# Patient Record
Sex: FEMALE | Race: WHITE | NOT HISPANIC OR LATINO | URBAN - METROPOLITAN AREA
[De-identification: names, ages, dates, MRNs, and addresses within clinical notes are randomized per-mention and may not be internally consistent; named-entity substitution may affect disease eponyms.]

---

## 2017-06-16 ENCOUNTER — COMMUNICATION - HEALTHEAST (OUTPATIENT)
Dept: HEALTH INFORMATION MANAGEMENT | Facility: CLINIC | Age: 32
End: 2017-06-16

## 2018-05-15 ENCOUNTER — OFFICE VISIT - HEALTHEAST (OUTPATIENT)
Dept: FAMILY MEDICINE | Facility: CLINIC | Age: 33
End: 2018-05-15

## 2018-05-15 DIAGNOSIS — Z00.00 ROUTINE GENERAL MEDICAL EXAMINATION AT A HEALTH CARE FACILITY: ICD-10-CM

## 2018-05-15 LAB
ANION GAP SERPL CALCULATED.3IONS-SCNC: 10 MMOL/L (ref 5–18)
BUN SERPL-MCNC: 16 MG/DL (ref 8–22)
CALCIUM SERPL-MCNC: 9.8 MG/DL (ref 8.5–10.5)
CHLORIDE BLD-SCNC: 106 MMOL/L (ref 98–107)
CHOLEST SERPL-MCNC: 135 MG/DL
CO2 SERPL-SCNC: 25 MMOL/L (ref 22–31)
CREAT SERPL-MCNC: 0.75 MG/DL (ref 0.6–1.1)
ERYTHROCYTE [DISTWIDTH] IN BLOOD BY AUTOMATED COUNT: 12.5 % (ref 11–14.5)
FASTING STATUS PATIENT QL REPORTED: ABNORMAL
GFR SERPL CREATININE-BSD FRML MDRD: >60 ML/MIN/1.73M2
GLUCOSE BLD-MCNC: 91 MG/DL (ref 70–125)
HCT VFR BLD AUTO: 39.7 % (ref 35–47)
HDLC SERPL-MCNC: 44 MG/DL
HGB BLD-MCNC: 13.4 G/DL (ref 12–16)
LDLC SERPL CALC-MCNC: 69 MG/DL
MCH RBC QN AUTO: 30 PG (ref 27–34)
MCHC RBC AUTO-ENTMCNC: 33.6 G/DL (ref 32–36)
MCV RBC AUTO: 89 FL (ref 80–100)
PLATELET # BLD AUTO: 199 THOU/UL (ref 140–440)
PMV BLD AUTO: 8.7 FL (ref 7–10)
POTASSIUM BLD-SCNC: 4.8 MMOL/L (ref 3.5–5)
RBC # BLD AUTO: 4.46 MILL/UL (ref 3.8–5.4)
SODIUM SERPL-SCNC: 141 MMOL/L (ref 136–145)
TRIGL SERPL-MCNC: 108 MG/DL
WBC: 5.4 THOU/UL (ref 4–11)

## 2018-05-15 ASSESSMENT — MIFFLIN-ST. JEOR: SCORE: 1345.09

## 2019-05-28 ENCOUNTER — OFFICE VISIT - HEALTHEAST (OUTPATIENT)
Dept: MIDWIFE SERVICES | Facility: CLINIC | Age: 34
End: 2019-05-28

## 2019-05-28 DIAGNOSIS — R35.0 URINARY FREQUENCY: ICD-10-CM

## 2019-05-28 DIAGNOSIS — Z12.4 SCREENING FOR CERVICAL CANCER: ICD-10-CM

## 2019-05-28 DIAGNOSIS — K64.9 HEMORRHOIDS, UNSPECIFIED HEMORRHOID TYPE: ICD-10-CM

## 2019-05-28 DIAGNOSIS — Z00.00 ANNUAL PHYSICAL EXAM: ICD-10-CM

## 2019-05-28 LAB
ALBUMIN UR-MCNC: NEGATIVE MG/DL
APPEARANCE UR: CLEAR
BILIRUB UR QL STRIP: NEGATIVE
COLOR UR AUTO: YELLOW
GLUCOSE UR STRIP-MCNC: NEGATIVE MG/DL
HGB UR QL STRIP: NEGATIVE
KETONES UR STRIP-MCNC: NEGATIVE MG/DL
LEUKOCYTE ESTERASE UR QL STRIP: NEGATIVE
NITRATE UR QL: NEGATIVE
PH UR STRIP: 7 [PH] (ref 5–8)
SP GR UR STRIP: 1.01 (ref 1–1.03)
UROBILINOGEN UR STRIP-ACNC: NORMAL

## 2019-05-28 ASSESSMENT — MIFFLIN-ST. JEOR: SCORE: 1370.49

## 2019-05-29 LAB
HPV SOURCE: NORMAL
HUMAN PAPILLOMA VIRUS 16 DNA: NEGATIVE
HUMAN PAPILLOMA VIRUS 18 DNA: NEGATIVE
HUMAN PAPILLOMA VIRUS FINAL DIAGNOSIS: NORMAL
HUMAN PAPILLOMA VIRUS OTHER HR: NEGATIVE
SPECIMEN DESCRIPTION: NORMAL

## 2019-06-03 LAB
BKR LAB AP ABNORMAL BLEEDING: NO
BKR LAB AP BIRTH CONTROL/HORMONES: NORMAL
BKR LAB AP CERVICAL APPEARANCE: NORMAL
BKR LAB AP GYN ADEQUACY: NORMAL
BKR LAB AP GYN INTERPRETATION: NORMAL
BKR LAB AP HPV REFLEX: NORMAL
BKR LAB AP LMP: NORMAL
BKR LAB AP PATIENT STATUS: NORMAL
BKR LAB AP PREVIOUS ABNORMAL: NO
BKR LAB AP PREVIOUS NORMAL: NORMAL
HIGH RISK?: NO
PATH REPORT.COMMENTS IMP SPEC: NORMAL
RESULT FLAG (HE HISTORICAL CONVERSION): NORMAL

## 2020-07-17 ENCOUNTER — COMMUNICATION - HEALTHEAST (OUTPATIENT)
Dept: MIDWIFE SERVICES | Facility: CLINIC | Age: 35
End: 2020-07-17

## 2021-05-28 ENCOUNTER — RECORDS - HEALTHEAST (OUTPATIENT)
Dept: ADMINISTRATIVE | Facility: CLINIC | Age: 36
End: 2021-05-28

## 2021-05-29 NOTE — PROGRESS NOTES
Assessment:     34 y.o., Healthy female exam.  Normal BMI  Cervical Cancer Screening  Urinary frequency  Vaginal Pressure  Hemorrhoids     Plan:      1. Discussed nutrition and exercise. Advised MVI, continuation Vitamin D3 2,000-4,000 IUs daily and calcium requirements.  2. Lab tests: Pap and UA/UC.  3. Discussed breast awareness. Has saline implants in place. Recommended annual breast exam by provider.  4. Contraception: condoms.   5. Encouraged kegel and core exercises for pelvic muscle/floor health and to support bladder. Aware of ability to be seen by pelvic PT if she desires in the future. Referral to GI given for hemorrhoid concern and desire for possible removal.  6. RTC 1 year for annual physical exam, PRN    Subjective:      Mady Phillips is a 34 y.o. female who presents for an annual exam.     Patient also has concerns of urinary frequency and vaginal fullness. Theses symptoms have been present since around last Her[place]. She denies painful urination.     Also has concern of external hemorrhoids that have been persistent since her vaginal deliveries (last birth in 2013). Occasionally they bleed. But she states they aren't that bothersome most days. Has used OTC topical creams in the past prn. She is more interested in having them removed for cosmetic reasons.    Healthy Habits:   Regular Exercise: Yes   Sunscreen Use: Yes  Healthy Diet: Yes  Dental Visits Regularly: Yes  Seat Belt: Yes  Sexually active: Yes  STI risk No  Domestic violence No    Lipid Profile: done 5/2018  Glucose Screen: declines today    Cancer screening:   Last Pap: 11/2014. Results were: normal  Last mammogram: N/A.     Gynecologic History  Patient's last menstrual period was 05/18/2019 (exact date).  Periods are normal, every 28-29 days, lastign 5 days. She does get bloating, cramping, and mood swings with menses. Unsure if she is done having biological children at this time.  Contraception: condoms   the patient is satisfied with  this method. Unsure if she will want more children in the future. She has 3 biological children and 5 adopted children.         OB History    Para Term  AB Living   3 3 3     3   SAB TAB Ectopic Multiple Live Births   0 0 0 0 3      # Outcome Date GA Lbr Zachery/2nd Weight Sex Delivery Anes PTL Lv   3 Term 13    F Vag-Spont   DAYA   2 Term 11    F Vag-Spont   DAYA   1 Term 04/04/10    F Vag-Spont   DAYA       Current Outpatient Medications   Medication Sig Dispense Refill     cholecalciferol, vitamin D3, (VITAMIN D3) 2,000 unit Tab Take 2 tablets by mouth daily.       LACTOBACILLUS ACIDOPHILUS (PROBIOTIC ORAL) Use as directed. Capsule.       MULTIVITAMIN (MULTIPLE VITAMIN ORAL) Take by mouth.       No current facility-administered medications for this visit.      Past Medical History:   Diagnosis Date     IBS (irritable bowel syndrome)     Sees [facility] as needed     Past Surgical History:   Procedure Laterality Date     BREAST SURGERY      Breast augmentation, saline implants     COLON SURGERY      Colonscopy for IBS     WISDOM TOOTH EXTRACTION       Patient has no known allergies.  Family History   Problem Relation Age of Onset     Alcohol abuse Mother      Stroke Mother      Mental illness Mother      Depression Mother      Endometriosis Mother      Asthma Mother      Drug abuse Mother      Heart disease Father      Hypertension Father      Hyperlipidemia Father      Hearing loss Father      No Medical Problems Brother      Alcohol abuse Maternal Grandmother      Early death Maternal Grandmother      Cancer Maternal Grandmother      Mental illness Maternal Grandmother      Brain cancer Paternal Grandfather      Cancer Maternal Uncle      Drug abuse Paternal Aunt      Drug abuse Paternal Uncle      Social History     Socioeconomic History     Marital status:      Spouse name: Not on file     Number of children: Not on file     Years of education: Not on file     Highest  "education level: Not on file   Occupational History     Not on file   Social Needs     Financial resource strain: Not on file     Food insecurity:     Worry: Not on file     Inability: Not on file     Transportation needs:     Medical: Not on file     Non-medical: Not on file   Tobacco Use     Smoking status: Never Smoker     Smokeless tobacco: Never Used   Substance and Sexual Activity     Alcohol use: Yes     Comment: Occasionally.     Drug use: No     Sexual activity: Yes     Partners: Male   Lifestyle     Physical activity:     Days per week: Not on file     Minutes per session: Not on file     Stress: Not on file   Relationships     Social connections:     Talks on phone: Not on file     Gets together: Not on file     Attends Jewish service: Not on file     Active member of club or organization: Not on file     Attends meetings of clubs or organizations: Not on file     Relationship status: Not on file     Intimate partner violence:     Fear of current or ex partner: Not on file     Emotionally abused: Not on file     Physically abused: Not on file     Forced sexual activity: Not on file   Other Topics Concern     Not on file   Social History Narrative     Not on file       Review of Systems  General: calm, in NAD  Eyes/Ears/Nose/Throat: Denies problem  Cardiovascular: Denies problem  Respiratory:  Denies problem  Gastrointestinal:  denies problems  Genitourinary: vaginal fullness and hemorrhoids  Musculoskeletal: Denies problem  Skin: Denies problem  Neurologic:denies problems  Psychiatric: denies problems  Endocrine: denies problems    Immunization History   Administered Date(s) Administered     Td,adult,historic,unspecified 05/07/2007     Tdap 09/03/2013       Objective:      Vitals:    05/28/19 1321   BP: 106/62   Pulse: 62   Resp: 14   Weight: 138 lb (62.6 kg)   Height: 5' 7.75\" (1.721 m)       Physical Exam:  General Appearance: alert, cooperative, no distress, appears stated age  Skin: skin color, " texture, turgor normal, no rashes or lesions  Throat: lips, mucosa, and tongue normal; teeth and gums normal  HEENT: grossly normal; otoscopic and opthalmic exam not performed.   Neck: supple, symmetrical, trachea midline, no adenopathy; thyroid: not enlarged, symmetric, no tenderness/mass/nodules  Lungs: clear to auscultation bilaterally, respirations unlabored  Breasts: no breast masses, tenderness, asymmetry, or nipple discharge.  Heart: regular rate and rhythm, S1 and S2 normal, no murmur, rub, or gallop  Abdomen: soft, non-tender. No organomegaly or masses  Pelvic: external genitalia normal without lesions or irritation. Vagina and cervix show no lesions, inflammation, discharge or tenderness. No cystocele, No rectocele. Moderate recruitment of pelvic floor muscles with kegel exercise. Uterus & adnexal normal without masses or tenderness. 2 small fleshy, soft external hemorrhoids present. No evidence of thrombosis.    Thania Le, MADDIE,CNM

## 2021-06-01 ENCOUNTER — RECORDS - HEALTHEAST (OUTPATIENT)
Dept: ADMINISTRATIVE | Facility: CLINIC | Age: 36
End: 2021-06-01

## 2021-06-01 VITALS — HEIGHT: 68 IN | BODY MASS INDEX: 20.07 KG/M2 | WEIGHT: 132.4 LBS

## 2021-06-02 ENCOUNTER — RECORDS - HEALTHEAST (OUTPATIENT)
Dept: ADMINISTRATIVE | Facility: CLINIC | Age: 36
End: 2021-06-02

## 2021-06-03 VITALS — HEIGHT: 68 IN | WEIGHT: 138 LBS | BODY MASS INDEX: 20.92 KG/M2

## 2021-06-16 PROBLEM — Z12.4 CERVICAL CANCER SCREENING: Status: ACTIVE | Noted: 2019-06-03

## 2021-06-18 NOTE — PROGRESS NOTES
Assessment:     1. Routine general medical examination at a health care facility  - Lipid Cascade  - HM2(CBC w/o Differential)  - Basic Metabolic Panel       Plan:       She does her gynaecological exam with the [facility] and is uptodate with her Pap..She is active and exercising. Feels well.Comes in with an form to be filled out.     Subjective:      Mady Phillips is a 32 y.o. female who presents for an annual exam. She does her Gynecological exam at the midwives.Last Pap was in 2014.She is active.The patient is sexually active. The patient participates in regular exercise: yes. The patient reports that there is no domestic violence in her life. She has about 5 adopted children with 3 biological children of her own.She wants to adopt another child and needs a physical for a form to be filled out for.She does not have any chronic medical diagnosis.    Healthy Habits:   Regular Exercise: Yes  Sunscreen Use: Yes  Healthy Diet: Yes  Dental Visits Regularly: Yes  Seat Belt: Yes  Sexually active: Yes  Self Breast Exam Monthly:Yes  Hemoccults: N/A  Flex Sig: N/A  Colonoscopy: N/A  Lipid Profile: Yes  Glucose Screen: Yes  Prevention of Osteoporosis: N/A  Last Dexa: N/A  Guns at Home:  No      Immunization History   Administered Date(s) Administered     Td,adult,historic,unspecified 05/07/2007     Tdap 09/03/2013     Immunization status: up to date and documented.    No exam data present    Gynecologic History  Patient's last menstrual period was 05/01/2018 (approximate).  Contraception: none  Last Pap: 2014. Results were: normal  Last mammogram: N/A.     OB History   No data available       Current Outpatient Prescriptions   Medication Sig Dispense Refill     cholecalciferol, vitamin D3, (VITAMIN D3) 2,000 unit Tab Take 2 tablets by mouth daily.       LACTOBACILLUS ACIDOPHILUS (PROBIOTIC ORAL) Use as directed. Capsule.       MULTIVITAMIN (MULTIPLE VITAMIN ORAL) Take by mouth.       PNV67/SOD IRON EDTA& PS/FA/OM3 (DUET  "DHA COMPLETE ORAL) Take 1 capsule by mouth daily. 200 MG Capsule       PRENATAL VIT/IRON FUMARATE/FA (PRENATAL ORAL) Take 1 tablet by mouth daily.       No current facility-administered medications for this visit.      No past medical history on file.  Past Surgical History:   Procedure Laterality Date     COLON SURGERY      Colonscopy.     WISDOM TOOTH EXTRACTION       Review of patient's allergies indicates no known allergies.  Family History   Problem Relation Age of Onset     Alcohol abuse Mother      Stroke Mother      Mental illness Mother      Depression Mother      Endometriosis Mother      Heart disease Father      Hypertension Father      Hyperlipidemia Father      No Medical Problems Brother      Social History     Social History     Marital status:      Spouse name: N/A     Number of children: N/A     Years of education: N/A     Occupational History     Not on file.     Social History Main Topics     Smoking status: Never Smoker     Smokeless tobacco: Never Used     Alcohol use Yes      Comment: Occasionally.     Drug use: No     Sexual activity: Yes     Partners: Male     Other Topics Concern     Not on file     Social History Narrative       Review of Systems  General:  Denies problem  Eyes: Denies problem  Ears/Nose/Throat: Denies problem  Cardiovascular: Denies problem  Respiratory:  Denies problem  Gastrointestinal:  Denies problem,   Genitourinary: Denies problem  Musculoskeletal:  Denies problem  Skin: Denies problem  Neurologic: Denies problem  Psychiatric: Denies problem  Endocrine: Denies problem  Heme/Lymphatic: Denies problem   Allergic/Immunologic: Denies problem        Objective:         Vitals:    05/15/18 1422   BP: 108/74   Pulse: 68   Weight: 132 lb 6.4 oz (60.1 kg)   Height: 5' 7.75\" (1.721 m)     Body mass index is 20.28 kg/(m^2).    Physical Exam:  General Appearance: Alert, cooperative, no distress, appears stated age  Head: Normocephalic, without obvious abnormality, " atraumatic  Eyes: PERRL, conjunctiva/corneas clear, EOM's intact  Ears: Normal TM's and external ear canals, both ears  Nose: Nares normal, septum midline,mucosa normal, no drainage  Throat: Lips, mucosa, and tongue normal; teeth and gums normal  Neck: Supple, symmetrical, trachea midline, no adenopathy;  thyroid: not enlarged, symmetric, no tenderness/mass/nodules; no carotid bruit or JVD  Back: Symmetric, no curvature, ROM normal, no CVA tenderness  Lungs: Clear to auscultation bilaterally, respirations unlabored  Breasts: She does her own exam.  Heart: Regular rate and rhythm, S1 and S2 normal, no murmur, rub, or gallop,   Abdomen: Soft, non-tender, bowel sounds active all four quadrants,  no masses, no organomegaly  Pelvic:Not examined  Extremities: Extremities normal, atraumatic, no cyanosis or edema  Skin: Skin color, texture, turgor normal, no rashes or lesions  Lymph nodes: Cervical, supraclavicular, and axillary nodes normal  Neurologic: Normal

## 2021-07-01 ENCOUNTER — RECORDS - HEALTHEAST (OUTPATIENT)
Dept: ADMINISTRATIVE | Facility: OTHER | Age: 36
End: 2021-07-01

## 2021-07-05 PROBLEM — Z98.82 HISTORY OF BREAST AUGMENTATION: Status: ACTIVE | Noted: 2021-07-01

## 2021-08-22 ENCOUNTER — HEALTH MAINTENANCE LETTER (OUTPATIENT)
Age: 36
End: 2021-08-22

## 2021-10-17 ENCOUNTER — HEALTH MAINTENANCE LETTER (OUTPATIENT)
Age: 36
End: 2021-10-17

## 2022-10-02 ENCOUNTER — HEALTH MAINTENANCE LETTER (OUTPATIENT)
Age: 37
End: 2022-10-02

## 2023-10-21 ENCOUNTER — HEALTH MAINTENANCE LETTER (OUTPATIENT)
Age: 38
End: 2023-10-21